# Patient Record
Sex: FEMALE | URBAN - METROPOLITAN AREA
[De-identification: names, ages, dates, MRNs, and addresses within clinical notes are randomized per-mention and may not be internally consistent; named-entity substitution may affect disease eponyms.]

---

## 2021-09-30 ENCOUNTER — APPOINTMENT (OUTPATIENT)
Dept: CT IMAGING | Facility: CLINIC | Age: 41
End: 2021-09-30
Attending: EMERGENCY MEDICINE

## 2021-09-30 ENCOUNTER — HOSPITAL ENCOUNTER (EMERGENCY)
Facility: CLINIC | Age: 41
Discharge: HOME | End: 2021-09-30
Attending: EMERGENCY MEDICINE | Admitting: EMERGENCY MEDICINE

## 2021-09-30 VITALS
SYSTOLIC BLOOD PRESSURE: 124 MMHG | HEART RATE: 68 BPM | OXYGEN SATURATION: 100 % | RESPIRATION RATE: 20 BRPM | TEMPERATURE: 98.6 F | WEIGHT: 170 LBS | DIASTOLIC BLOOD PRESSURE: 83 MMHG

## 2021-09-30 DIAGNOSIS — S09.90XA INJURY OF HEAD, INITIAL ENCOUNTER: ICD-10-CM

## 2021-09-30 DIAGNOSIS — S20.211A CONTUSION OF RIB ON RIGHT SIDE, INITIAL ENCOUNTER: ICD-10-CM
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
## 2021-09-30 LAB
ALBUMIN SERPL-MCNC: 3.6 G/DL (ref 3.4–5)
ALP SERPL-CCNC: 134 U/L (ref 40–150)
ALT SERPL W P-5'-P-CCNC: 20 U/L (ref 0–50)
ANION GAP SERPL CALCULATED.3IONS-SCNC: 7 MMOL/L (ref 3–14)
AST SERPL W P-5'-P-CCNC: 9 U/L (ref 0–45)
BASOPHILS # BLD AUTO: 0.1 10E3/UL (ref 0–0.2)
BASOPHILS NFR BLD AUTO: 1 %
BILIRUB SERPL-MCNC: 0.3 MG/DL (ref 0.2–1.3)
BUN SERPL-MCNC: 11 MG/DL (ref 7–30)
CALCIUM SERPL-MCNC: 9 MG/DL (ref 8.5–10.1)
CHLORIDE BLD-SCNC: 113 MMOL/L (ref 94–109)
CO2 SERPL-SCNC: 22 MMOL/L (ref 20–32)
CREAT SERPL-MCNC: 0.69 MG/DL (ref 0.52–1.04)
EOSINOPHIL # BLD AUTO: 0.2 10E3/UL (ref 0–0.7)
EOSINOPHIL NFR BLD AUTO: 2 %
ERYTHROCYTE [DISTWIDTH] IN BLOOD BY AUTOMATED COUNT: 14.9 % (ref 10–15)
GFR SERPL CREATININE-BSD FRML MDRD: >90 ML/MIN/1.73M2
GLUCOSE BLD-MCNC: 83 MG/DL (ref 70–99)
HCT VFR BLD AUTO: 38.7 % (ref 35–47)
HGB BLD-MCNC: 12.2 G/DL (ref 11.7–15.7)
HOLD SPECIMEN: NORMAL
IMM GRANULOCYTES # BLD: 0.1 10E3/UL
IMM GRANULOCYTES NFR BLD: 1 %
INR PPP: 0.97 (ref 0.85–1.15)
LIPASE SERPL-CCNC: 98 U/L (ref 73–393)
LYMPHOCYTES # BLD AUTO: 1.4 10E3/UL (ref 0.8–5.3)
LYMPHOCYTES NFR BLD AUTO: 17 %
MCH RBC QN AUTO: 27.3 PG (ref 26.5–33)
MCHC RBC AUTO-ENTMCNC: 31.5 G/DL (ref 31.5–36.5)
MCV RBC AUTO: 87 FL (ref 78–100)
MONOCYTES # BLD AUTO: 0.5 10E3/UL (ref 0–1.3)
MONOCYTES NFR BLD AUTO: 7 %
NEUTROPHILS # BLD AUTO: 6 10E3/UL (ref 1.6–8.3)
NEUTROPHILS NFR BLD AUTO: 72 %
NRBC # BLD AUTO: 0 10E3/UL
NRBC BLD AUTO-RTO: 0 /100
PLATELET # BLD AUTO: 222 10E3/UL (ref 150–450)
POTASSIUM BLD-SCNC: 3.9 MMOL/L (ref 3.4–5.3)
PROT SERPL-MCNC: 7.5 G/DL (ref 6.8–8.8)
RBC # BLD AUTO: 4.47 10E6/UL (ref 3.8–5.2)
SODIUM SERPL-SCNC: 142 MMOL/L (ref 133–144)
WBC # BLD AUTO: 8.2 10E3/UL (ref 4–11)

## 2021-09-30 PROCEDURE — 76705 ECHO EXAM OF ABDOMEN: CPT

## 2021-09-30 PROCEDURE — 250N000013 HC RX MED GY IP 250 OP 250 PS 637: Performed by: EMERGENCY MEDICINE

## 2021-09-30 PROCEDURE — 36415 COLL VENOUS BLD VENIPUNCTURE: CPT | Performed by: EMERGENCY MEDICINE

## 2021-09-30 PROCEDURE — 250N000011 HC RX IP 250 OP 636: Performed by: EMERGENCY MEDICINE

## 2021-09-30 PROCEDURE — 70450 CT HEAD/BRAIN W/O DYE: CPT

## 2021-09-30 PROCEDURE — 85025 COMPLETE CBC W/AUTO DIFF WBC: CPT | Performed by: EMERGENCY MEDICINE

## 2021-09-30 PROCEDURE — 96374 THER/PROPH/DIAG INJ IV PUSH: CPT

## 2021-09-30 PROCEDURE — 99285 EMERGENCY DEPT VISIT HI MDM: CPT | Mod: 25

## 2021-09-30 PROCEDURE — 258N000003 HC RX IP 258 OP 636: Performed by: EMERGENCY MEDICINE

## 2021-09-30 PROCEDURE — 85610 PROTHROMBIN TIME: CPT | Performed by: EMERGENCY MEDICINE

## 2021-09-30 PROCEDURE — 83690 ASSAY OF LIPASE: CPT | Performed by: EMERGENCY MEDICINE

## 2021-09-30 PROCEDURE — 76604 US EXAM CHEST: CPT

## 2021-09-30 PROCEDURE — 71250 CT THORAX DX C-: CPT

## 2021-09-30 PROCEDURE — 96361 HYDRATE IV INFUSION ADD-ON: CPT

## 2021-09-30 PROCEDURE — 80053 COMPREHEN METABOLIC PANEL: CPT | Performed by: EMERGENCY MEDICINE

## 2021-09-30 RX ORDER — OXYCODONE HYDROCHLORIDE 5 MG/1
5 TABLET ORAL EVERY 6 HOURS PRN
Qty: 2 TABLET | Refills: 0 | Status: SHIPPED | OUTPATIENT
Start: 2021-09-30

## 2021-09-30 RX ORDER — OXYCODONE HYDROCHLORIDE 5 MG/1
5 TABLET ORAL ONCE
Status: COMPLETED | OUTPATIENT
Start: 2021-09-30 | End: 2021-09-30

## 2021-09-30 RX ORDER — SODIUM CHLORIDE 9 MG/ML
INJECTION, SOLUTION INTRAVENOUS CONTINUOUS
Status: DISCONTINUED | OUTPATIENT
Start: 2021-09-30 | End: 2021-09-30 | Stop reason: HOSPADM

## 2021-09-30 RX ORDER — HYDROMORPHONE HYDROCHLORIDE 1 MG/ML
0.5 INJECTION, SOLUTION INTRAMUSCULAR; INTRAVENOUS; SUBCUTANEOUS
Status: COMPLETED | OUTPATIENT
Start: 2021-09-30 | End: 2021-09-30

## 2021-09-30 RX ORDER — ACETAMINOPHEN 325 MG/1
975 TABLET ORAL ONCE
Status: COMPLETED | OUTPATIENT
Start: 2021-09-30 | End: 2021-09-30

## 2021-09-30 RX ADMIN — ACETAMINOPHEN 325 MG: 325 TABLET, FILM COATED ORAL at 18:09

## 2021-09-30 RX ADMIN — SODIUM CHLORIDE 1000 ML: 9 INJECTION, SOLUTION INTRAVENOUS at 17:03

## 2021-09-30 RX ADMIN — HYDROMORPHONE HYDROCHLORIDE 0.5 MG: 1 INJECTION, SOLUTION INTRAMUSCULAR; INTRAVENOUS; SUBCUTANEOUS at 17:03

## 2021-09-30 RX ADMIN — OXYCODONE HYDROCHLORIDE 5 MG: 5 TABLET ORAL at 18:09

## 2021-09-30 NOTE — DISCHARGE INSTRUCTIONS
Please return to the ED if you have active chest pain, shortness of breath, nausea, sweatiness, or other acute changes.  Please follow up with your PCP in the next 2-3 days.      Discharge Instructions  Chest Injury    You have been seen today because of a chest injury.  You may have contusion (bruise) of the chest or a rib fracture (broken bone).  Rib fractures can be hard to see on x-ray, so we cannot always be sure whether your rib is broken or bruised. Fortunately, the treatment of these injuries is usually the same, and includes pain control and preventing complications.    Generally, every Emergency Department visit should have a follow-up clinic visit with either a primary or a specialty clinic/provider. Please follow-up as instructed by your emergency provider today.    Return to the Emergency Department if:  You become short of breath.  You develop a fever over 101.5 F.  You pass out or become very weak or pale.  You have abdominal (belly) pain that is new or increasing.  You cough up blood.  You have new symptoms or anything that worries you.    Follow-up with your provider:  As directed by your provider today.  If you are not improved in two weeks.  If you need more pain medicine, since we do not refill pain pills through the Emergency Department.    Home care instructions:  Chest injuries can be painful.  You may take an over-the-counter pain medication such as Tylenol  (acetaminophen), Advil  (ibuprofen), Motrin  (ibuprofen) or Aleve  (naproxen).  Applying ice packs to the painful area can help your pain.   Holding a pillow against your chest can help with pain when you need to move or cough.  You may need to rest and avoid lifting particularly in the first few days after your injury.  Prevention of pneumonia (lung infection) is also a part of managing chest injuries.  Because it can hurt to take deep breaths, you could develop collapsed areas of lung that can develop infection.  To prevent this, you  need to take ten very deep breaths every hour while you are awake. Sometimes you will be given a device called an incentive spirometer to help with this. You also need to make yourself cough every hour.  Rib belts or binders are not generally recommended, since they may increase the risk of pneumonia. If you do use one, use it for only short periods of time.   If you were given a prescription for medicine here today, be sure to read all of the information (including the package insert) that comes with your prescription.  This will include important information about the medicine, its side effects, and any warnings that you need to know about.  The pharmacist who fills the prescription can provide more information and answer questions you may have about the medicine.  If you have questions or concerns that the pharmacist cannot address, please call or return to the Emergency Department.   Remember that you can always come back to the Emergency Department if you are not able to see your regular provider in the amount of time listed above, if you get any new symptoms, or if there is anything that worries you.    Discharge Instructions  Head Injury    You have been seen today for a head injury. Your evaluation included a history and physical examination. You may have had a CT (CAT) scan performed, though most head injuries do not require a scan. Based on this evaluation, your provider today does not feel that your head injury is serious.    Generally, every Emergency Department visit should have a follow-up clinic visit with either a primary or a specialty clinic/provider. Please follow-up as instructed by your emergency provider today.  Return to the Emergency Department if:  You are confused or you are not acting right.  Your headache gets worse or you start to have a really bad headache even with your recommended treatment plan.  You vomit (throw up) more than once.  You have a seizure.  You have trouble  walking.  You have weakness or paralysis (cannot move) in an arm or a leg.  You have blood or fluid coming from your ears or nose.  You have new symptoms or anything that worries you.    Sleeping:  It is okay for you to sleep, but someone should wake you up if instructed by your provider, and someone should check on you at your usual time to wake up.     Activity:  Do not drive for at least 24 hours.  Do not drive if you have dizzy spells or trouble concentrating, or remembering things.  Do not return to any contact sports until cleared by your regular provider.     MORE INFORMATION:    Concussion:  A concussion is a minor head injury that may cause temporary problems with the way the brain works. Although concussions are important, they are generally not an emergency or a reason that a person needs to be hospitalized. Some concussion symptoms include confusion, amnesia (forgetful), nausea (sick to your stomach) and vomiting (throwing up), dizziness, fatigue, memory or concentration problems, irritability and sleep problems. For most people, concussions are mild and temporary but some will have more severe and persistent symptoms that require on-going care and treatment.  CT Scans: Your evaluation today may have included a CT scan (CAT scan) to look for things like bleeding or a skull fracture (broken bone).  CT scans involve radiation and too many CT scans can cause serious health problems like cancer, especially in children.  Because of this, your provider may not have ordered a CT scan today if they think you are at low risk for a serious or life threatening problem.    If you were given a prescription for medicine here today, be sure to read all of the information (including the package insert) that comes with your prescription.  This will include important information about the medicine, its side effects, and any warnings that you need to know about.  The pharmacist who fills the prescription can provide more  information and answer questions you may have about the medicine.  If you have questions or concerns that the pharmacist cannot address, please call or return to the Emergency Department.     Remember that you can always come back to the Emergency Department if you are not able to see your regular provider in the amount of time listed above, if you get any new symptoms, or if there is anything that worries you.

## 2021-09-30 NOTE — ED PROVIDER NOTES
History     Chief Complaint:  Fall    HPI   Aditi Salazar is a 41 year old female who has a history of stroke, aneurysm and takes Xarelto and presents with a fall. The patient fell about 11 feet down onto concrete and grass from a scaffolding. She landed on her right side, but denies hitting her head or injuring her head. She denies any loss of consciousness, and has some nausea. It hurts for her to breathe, and she also has some abdominal pain. She has no no leg or arm pain. She has no numbness or weakness. She was not able to walk immediately after the fall, but was able to walk when coming into the ED. She has not taken her Xarelto for the past 4 days.    Review of Systems   All other systems reviewed and are negative.        Allergies:  Contrast Dye  Fentanyl  Nsaids  Tramadol      Medications:    Xarelto    Past Medical History:     Stroke  Aneurysm      Social History:  Patient presents to the ED alone  Patient smokes tobacco    Physical Exam     Patient Vitals for the past 24 hrs:   BP Temp Temp src Pulse Resp SpO2 Weight   09/30/21 1830 124/83 -- -- 68 -- 100 % --   09/30/21 1815 124/81 -- -- 59 -- 100 % --   09/30/21 1800 129/88 -- -- 68 -- 100 % --   09/30/21 1620 (!) 126/93 -- -- 78 -- 100 % --   09/30/21 1605 121/88 -- -- -- -- -- 77.1 kg (170 lb)   09/30/21 1604 -- 98.6  F (37  C) Temporal 91 20 96 % --       Physical Exam  General: Resting on the bed.  Head: No obvious trauma to head.  Ears, Nose, Throat:  External ears normal.  Nose normal.    Eyes:  Conjunctivae clear.  Pupils are equal, round, and reactive.  EOMI  Neck: Normal range of motion.  Neck supple.  non tender c spine without step off or deformity   CV: Regular rate and rhythm.  No murmurs.      Respiratory: Effort normal and breath sounds normal.  No wheezing or crackles.   Gastrointestinal: Soft.  No distension. There is RUQ tenderness.  There is no rigidity, no rebound and no guarding.   Musculoskeletal: Normal range of motion.  Non  tender extremities to palpations.  Stable pelvis to anterior palpation.  Tender along the right costal margin.  Nontender thoracic, lumbar spine without step-off or deformity.  Neuro: Alert. Moving all extremities appropriately.  Normal speech.  GCS 15.  CN II-XII grossly intact.  Gross muscle strength intact of the proximal and distal bilateral upper and lower extremities.  Sensation intact to light touch in all 4 extremities.   Skin: Skin is warm and dry.  No rash noted.       Emergency Department Course     Imaging:  CT Chest Abdomen Pelvis w/o Contrast  Final Result  IMPRESSION:  1.  Mosaic attenuation in the lungs with regions of mucus plugging in the lung bases. This can be seen with air trapping.    2.  Cholecystectomy with previous gastric bypass and hysterectomy. Postsurgical changes lumbar spine.    3.  Changes of avascular necrosis proximal left femur.    CT Head w/o Contrast  Final Result  IMPRESSION:  1.  No evidence of acute intracranial pathology by head CT.  2.  Vascular stent in the distal left ICA and M1 segment likely due to previous aneurysm treatment.        Laboratory:  CBC: WBC 8.2, HGB 12.2,   CMP: Chloride 113 H, o/w WNL (Creatinine 0.69)  INR: pending  Lipase: 98      Emergency Department Course:    Bedside FAST (Focused Assessment with Sonography in Trauma), performed and interpreted by me.   Indication: Trauma    With the patient in Trendelenburg, the RUQ, LUQ and subxiphoid views were examined for intraabdominal and thoracic free fluid and pericardial effusion. With the patient in reverse Trendelenburg, the suprapubic view was examined for intraabdominal free fluid. Image quality was satisfactory.  Unable to save images.      Findings: There is no evidence of free fluid above or below bilateral diaphragms, in the splenorenal or hepatorenal space, or in bilateral paracolic gutters. There was no free fluid seen in the pelvis adjacent to the urinary bladder. There is no free fluid  within the pericardium.   Extended FAST exam (eFAST):   Indication: Trauma   The chest wall was evaluated for evidence of pneumothorax.     Right side:  Lung sliding artifact  Present       Left side:  Lung sliding artifact  Present           IMPRESSION:  Negative FAST      Reviewed:  I reviewed nursing notes, vitals and past history    Assessments:  1609 I obtained history and examined the patient as noted above.   181 I rechecked the patient and explained findings.   1845 The patient eloped after I rechecked her and before she was given discharge instructions    Interventions:  1703 NS 1000 mL IV  1703 Dilaudid 0.5 mg IV  1809 Tylenol 325 mg PO  1809 Oxycodone 5 mg PO    Disposition:  The patient eloped from the emergency department    Impression & Plan      Medical Decision Makin-year-old female presents with fall.  Vital signs are stable.  Broad differential was pursued including not limited to fracture, subluxation, pneumothorax, hemothorax, intra-abdominal injury, head injury, intracranial hemorrhage, etc.  Overall patient's well-appearing nontoxic.  GCS 15.  Patient is on Xarelto but has not taken this for over 4 days.  Regardless head CT was obtained showing no acute intracranial pathology.  CT chest abdomen pelvis was obtained showing no evidence acute thoracic or abdominal injury noted.  Basic labs were unrevealing.  CBC without leukocytosis or anemia.  BMP without any acute electrolyte, metabolic, renal dysfunction.  LFTs and lipase are reassuring.  INR normal.  At this point there is no identifiable injury from patient's fall.  Patient eloped prior to receiving her discharge paperwork.  Initially we discussed potentially giving 2 doses of oxycodone for home but patient did not stick around for this prescription.  Patient eloped.      Diagnosis:    ICD-10-CM    1. Fall, initial encounter  W19.XXXA    2. Injury of head, initial encounter  S09.90XA    3. Contusion of rib on right side, initial  encounter  S20.211A        Discharge Medications:  Discharge Medication List as of 9/30/2021  7:09 PM      START taking these medications    Details   oxyCODONE (ROXICODONE) 5 MG tablet Take 1 tablet (5 mg) by mouth every 6 hours as needed for pain, Disp-2 tablet, R-0, Local Print               Scribe Disclosure:  Melita KERNS Hired, am serving as a scribe at 4:08 PM on 9/30/2021 to document services personally performed by Kayley Calloway MD based on my observations and the provider's statements to me.      Kayley Calloway MD  09/30/21 [id number]

## 2021-09-30 NOTE — ED NOTES
Pt declined to stay in ER to get discharge paperwork, IV pulled per patient's request. Dr. Calloway notified.

## 2021-09-30 NOTE — ED TRIAGE NOTES
Fell onto concrete and grass from 11 ft from scaffolding. Unsure if she fell onto her back or front or if she hit her head. C/O right upper quadrant abdominal pain and rib pain. Denies pain elsewhere.   
Plan: Apply ice to areas that are itching.
Detail Level: Zone